# Patient Record
Sex: FEMALE | Race: WHITE
[De-identification: names, ages, dates, MRNs, and addresses within clinical notes are randomized per-mention and may not be internally consistent; named-entity substitution may affect disease eponyms.]

---

## 2019-01-13 ENCOUNTER — HOSPITAL ENCOUNTER (EMERGENCY)
Dept: HOSPITAL 91 - E/R | Age: 54
Discharge: HOME | End: 2019-01-13
Payer: SELF-PAY

## 2019-01-13 ENCOUNTER — HOSPITAL ENCOUNTER (EMERGENCY)
Dept: HOSPITAL 10 - E/R | Age: 54
Discharge: HOME | End: 2019-01-13
Payer: SELF-PAY

## 2019-01-13 VITALS — HEART RATE: 82 BPM | DIASTOLIC BLOOD PRESSURE: 82 MMHG | SYSTOLIC BLOOD PRESSURE: 139 MMHG | RESPIRATION RATE: 19 BRPM

## 2019-01-13 VITALS — HEIGHT: 55 IN | BODY MASS INDEX: 31.89 KG/M2 | WEIGHT: 137.79 LBS
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
## 2019-01-13 DIAGNOSIS — Z23: ICD-10-CM

## 2019-01-13 DIAGNOSIS — S01.81XA: Primary | ICD-10-CM

## 2019-01-13 PROCEDURE — 90715 TDAP VACCINE 7 YRS/> IM: CPT

## 2019-01-13 PROCEDURE — 71045 X-RAY EXAM CHEST 1 VIEW: CPT

## 2019-01-13 PROCEDURE — 90471 IMMUNIZATION ADMIN: CPT

## 2019-01-13 PROCEDURE — 99284 EMERGENCY DEPT VISIT MOD MDM: CPT

## 2019-01-13 PROCEDURE — 70450 CT HEAD/BRAIN W/O DYE: CPT

## 2019-01-13 PROCEDURE — 12013 RPR F/E/E/N/L/M 2.6-5.0 CM: CPT

## 2019-01-13 PROCEDURE — 72125 CT NECK SPINE W/O DYE: CPT

## 2019-01-13 RX ADMIN — LIDOCAINE HYDROCHLORIDE 1 ML: 10 INJECTION, SOLUTION INFILTRATION; PERINEURAL at 23:10

## 2019-01-13 RX ADMIN — OXYCODONE HYDROCHLORIDE AND ACETAMINOPHEN 1 TAB: 5; 325 TABLET ORAL at 23:07

## 2019-01-13 RX ADMIN — CLOSTRIDIUM TETANI TOXOID ANTIGEN (FORMALDEHYDE INACTIVATED), CORYNEBACTERIUM DIPHTHERIAE TOXOID ANTIGEN (FORMALDEHYDE INACTIVATED), BORDETELLA PERTUSSIS TOXOID ANTIGEN (GLUTARALDEHYDE INACTIVATED), BORDETELLA PERTUSSIS FILAMENTOUS HEMAGGLUTININ ANTIGEN (FORMALDEHYDE INACTIVATED), BORDETELLA PERTUSSIS PERTACTIN ANTIGEN, AND BORDETELLA PERTUSSIS FIMBRIAE 2/3 ANTIGEN 1 ML: 5; 2; 2.5; 5; 3; 5 INJECTION, SUSPENSION INTRAMUSCULAR at 23:07
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
